# Patient Record
Sex: MALE | Race: WHITE | URBAN - METROPOLITAN AREA
[De-identification: names, ages, dates, MRNs, and addresses within clinical notes are randomized per-mention and may not be internally consistent; named-entity substitution may affect disease eponyms.]

---

## 2024-11-12 DIAGNOSIS — Z00.00 ENCOUNTER FOR PREVENTIVE HEALTH EXAMINATION: Primary | ICD-10-CM

## 2024-11-19 PROBLEM — Z00.00 WELL ADULT EXAM: Status: ACTIVE | Noted: 2024-11-19

## 2024-11-19 PROBLEM — M54.50 CHRONIC LOW BACK PAIN: Status: ACTIVE | Noted: 2024-11-19

## 2024-11-19 PROBLEM — J30.9 ALLERGIC RHINITIS: Status: ACTIVE | Noted: 2024-11-19

## 2024-11-19 PROBLEM — G89.29 CHRONIC LOW BACK PAIN: Status: ACTIVE | Noted: 2024-11-19

## 2024-11-19 NOTE — ASSESSMENT & PLAN NOTE
1st    George is the  of [employer]. He and his family reside in [place].    George's past medical history is significant for chronic low back pain, fibula fracture in 2020, allergic rhinitis, and possible fibromyalgia.    His past surgical history is significant for excision of pilonidal cyst in 2000, and stem cell injection in low back in 2021.    George's past family history is significant for optic drusen in both mother and father.    He is a non-smoker.  Drinks approximately 1 alcoholic beverage per day.  He consumes 1 cup of coffee daily.  George is  with a 4-year-old child.  He states his diet is somewhat healthy.  He exercises regularly; he is an avid surfer, hiker, mountain biker, and ski racer.

## 2024-11-26 ENCOUNTER — LAB (OUTPATIENT)
Dept: LAB | Facility: CLINIC | Age: 44
End: 2024-11-26

## 2024-11-26 ENCOUNTER — OFFICE VISIT (OUTPATIENT)
Dept: FAMILY MEDICINE CLINIC | Facility: CLINIC | Age: 44
End: 2024-11-26

## 2024-11-26 ENCOUNTER — HOSPITAL ENCOUNTER (OUTPATIENT)
Dept: ULTRASOUND IMAGING | Facility: HOSPITAL | Age: 44
Discharge: HOME | End: 2024-11-26

## 2024-11-26 ENCOUNTER — HOSPITAL ENCOUNTER (OUTPATIENT)
Dept: CT IMAGING | Facility: HOSPITAL | Age: 44
Discharge: HOME | End: 2024-11-26

## 2024-11-26 ENCOUNTER — HOSPITAL ENCOUNTER (OUTPATIENT)
Dept: NON INVASIVE DIAGNOSTICS | Facility: CLINIC | Age: 44
Discharge: HOME | End: 2024-11-26

## 2024-11-26 ENCOUNTER — HOSPITAL ENCOUNTER (OUTPATIENT)
Dept: VASCULAR ULTRASOUND | Facility: HOSPITAL | Age: 44
Discharge: HOME | End: 2024-11-26

## 2024-11-26 VITALS
OXYGEN SATURATION: 100 % | DIASTOLIC BLOOD PRESSURE: 68 MMHG | BODY MASS INDEX: 24.22 KG/M2 | HEIGHT: 71 IN | WEIGHT: 173 LBS | RESPIRATION RATE: 14 BRPM | HEART RATE: 73 BPM | SYSTOLIC BLOOD PRESSURE: 108 MMHG

## 2024-11-26 VITALS
WEIGHT: 173 LBS | DIASTOLIC BLOOD PRESSURE: 68 MMHG | BODY MASS INDEX: 24.22 KG/M2 | HEART RATE: 73 BPM | HEIGHT: 71 IN | SYSTOLIC BLOOD PRESSURE: 108 MMHG

## 2024-11-26 DIAGNOSIS — Z00.00 ENCOUNTER FOR PREVENTIVE HEALTH EXAMINATION: ICD-10-CM

## 2024-11-26 DIAGNOSIS — E55.9 VITAMIN D DEFICIENCY: ICD-10-CM

## 2024-11-26 DIAGNOSIS — M54.50 CHRONIC LOW BACK PAIN, UNSPECIFIED BACK PAIN LATERALITY, UNSPECIFIED WHETHER SCIATICA PRESENT: ICD-10-CM

## 2024-11-26 DIAGNOSIS — E03.8 SUBCLINICAL HYPOTHYROIDISM: ICD-10-CM

## 2024-11-26 DIAGNOSIS — R73.01 IMPAIRED FASTING GLUCOSE: Primary | ICD-10-CM

## 2024-11-26 DIAGNOSIS — G89.29 CHRONIC LOW BACK PAIN, UNSPECIFIED BACK PAIN LATERALITY, UNSPECIFIED WHETHER SCIATICA PRESENT: ICD-10-CM

## 2024-11-26 LAB
25(OH)D3 SERPL-MCNC: 23.4 NG/ML (ref 30–100)
ALBUMIN SERPL BCG-MCNC: 4.5 G/DL (ref 3.5–5)
ALP SERPL-CCNC: 50 U/L (ref 34–104)
ALT SERPL W P-5'-P-CCNC: 14 U/L (ref 7–52)
ANION GAP SERPL CALCULATED.3IONS-SCNC: 5 MMOL/L (ref 4–13)
AORTIC ROOT: 3.4 CM
APICAL FOUR CHAMBER EJECTION FRACTION: 55 %
ASCENDING AORTA: 3.1 CM
AST SERPL W P-5'-P-CCNC: 16 U/L (ref 13–39)
ATRIAL RATE: 63 BPM
BACTERIA UR QL AUTO: ABNORMAL /HPF
BASOPHILS # BLD AUTO: 0.02 THOUSANDS/ΜL (ref 0–0.1)
BASOPHILS NFR BLD AUTO: 0 % (ref 0–1)
BILIRUB SERPL-MCNC: 0.63 MG/DL (ref 0.2–1)
BILIRUB UR QL STRIP: NEGATIVE
BSA FOR ECHO PROCEDURE: 1.98 M2
BUN SERPL-MCNC: 17 MG/DL (ref 5–25)
CALCIUM SERPL-MCNC: 9.9 MG/DL (ref 8.4–10.2)
CHEST PAIN STATEMENT: NORMAL
CHLORIDE SERPL-SCNC: 102 MMOL/L (ref 96–108)
CHOLEST SERPL-MCNC: 135 MG/DL (ref ?–200)
CLARITY UR: CLEAR
CO2 SERPL-SCNC: 32 MMOL/L (ref 21–32)
COLOR UR: ABNORMAL
CREAT SERPL-MCNC: 0.8 MG/DL (ref 0.6–1.3)
CRP SERPL HS-MCNC: 2.05 MG/L
E WAVE DECELERATION TIME: 177 MS
E/A RATIO: 1.63
EOSINOPHIL # BLD AUTO: 0.11 THOUSAND/ΜL (ref 0–0.61)
EOSINOPHIL NFR BLD AUTO: 2 % (ref 0–6)
ERYTHROCYTE [DISTWIDTH] IN BLOOD BY AUTOMATED COUNT: 12.4 % (ref 11.6–15.1)
EST. AVERAGE GLUCOSE BLD GHB EST-MCNC: 123 MG/DL
FRACTIONAL SHORTENING: 42 (ref 28–44)
GFR SERPL CREATININE-BSD FRML MDRD: 109 ML/MIN/1.73SQ M
GLUCOSE P FAST SERPL-MCNC: 106 MG/DL (ref 65–99)
GLUCOSE UR STRIP-MCNC: NEGATIVE MG/DL
HBA1C MFR BLD: 5.9 %
HCT VFR BLD AUTO: 42.2 % (ref 36.5–49.3)
HCV AB SER QL: NORMAL
HDLC SERPL-MCNC: 52 MG/DL
HGB BLD-MCNC: 13.9 G/DL (ref 12–17)
HGB UR QL STRIP.AUTO: NEGATIVE
IMM GRANULOCYTES # BLD AUTO: 0.01 THOUSAND/UL (ref 0–0.2)
IMM GRANULOCYTES NFR BLD AUTO: 0 % (ref 0–2)
INTERVENTRICULAR SEPTUM IN DIASTOLE (PARASTERNAL SHORT AXIS VIEW): 0.8 CM
INTERVENTRICULAR SEPTUM: 0.8 CM (ref 0.6–1.1)
KETONES UR STRIP-MCNC: NEGATIVE MG/DL
LAAS-AP2: 20 CM2
LAAS-AP4: 20 CM2
LDLC SERPL CALC-MCNC: 65 MG/DL (ref 0–100)
LEFT ATRIUM SIZE: 3.4 CM
LEFT ATRIUM VOLUME (MOD BIPLANE): 58 ML
LEFT ATRIUM VOLUME INDEX (MOD BIPLANE): 29.3 ML/M2
LEFT INTERNAL DIMENSION IN SYSTOLE: 2.9 CM (ref 2.1–4)
LEFT VENTRICLE DIASTOLIC VOLUME (MOD BIPLANE): 118 ML
LEFT VENTRICLE DIASTOLIC VOLUME INDEX (MOD BIPLANE): 59.6 ML/M2
LEFT VENTRICLE SYSTOLIC VOLUME (MOD BIPLANE): 46 ML
LEFT VENTRICLE SYSTOLIC VOLUME INDEX (MOD BIPLANE): 23.2 ML/M2
LEFT VENTRICULAR INTERNAL DIMENSION IN DIASTOLE: 5 CM (ref 3.5–6)
LEFT VENTRICULAR POSTERIOR WALL IN END DIASTOLE: 0.9 CM
LEFT VENTRICULAR STROKE VOLUME: 88 ML
LEUKOCYTE ESTERASE UR QL STRIP: NEGATIVE
LV EF: 61 %
LVSV (TEICH): 88 ML
LYMPHOCYTES # BLD AUTO: 1.91 THOUSANDS/ΜL (ref 0.6–4.47)
LYMPHOCYTES NFR BLD AUTO: 39 % (ref 14–44)
MAX DIASTOLIC BP: 64 MMHG
MAX HR PERCENT: 102 %
MAX HR: 181 BPM
MAX PREDICTED HEART RATE: 177 BPM
MCH RBC QN AUTO: 28.7 PG (ref 26.8–34.3)
MCHC RBC AUTO-ENTMCNC: 32.9 G/DL (ref 31.4–37.4)
MCV RBC AUTO: 87 FL (ref 82–98)
MONOCYTES # BLD AUTO: 0.44 THOUSAND/ΜL (ref 0.17–1.22)
MONOCYTES NFR BLD AUTO: 9 % (ref 4–12)
MUCOUS THREADS UR QL AUTO: ABNORMAL
MV E'TISSUE VEL-LAT: 17 CM/S
MV E'TISSUE VEL-SEP: 13 CM/S
MV PEAK A VEL: 0.38 M/S
MV PEAK E VEL: 62 CM/S
MV STENOSIS PRESSURE HALF TIME: 51 MS
MV VALVE AREA P 1/2 METHOD: 4.31
NEUTROPHILS # BLD AUTO: 2.39 THOUSANDS/ΜL (ref 1.85–7.62)
NEUTS SEG NFR BLD AUTO: 50 % (ref 43–75)
NITRITE UR QL STRIP: NEGATIVE
NON-SQ EPI CELLS URNS QL MICRO: ABNORMAL /HPF
NRBC BLD AUTO-RTO: 0 /100 WBCS
P AXIS: 56 DEGREES
PH UR STRIP.AUTO: 5 [PH]
PLATELET # BLD AUTO: 177 THOUSANDS/UL (ref 149–390)
PMV BLD AUTO: 9.1 FL (ref 8.9–12.7)
POTASSIUM SERPL-SCNC: 4.1 MMOL/L (ref 3.5–5.3)
PR INTERVAL: 150 MS
PROT SERPL-MCNC: 7.2 G/DL (ref 6.4–8.4)
PROT UR STRIP-MCNC: NEGATIVE MG/DL
PROTOCOL NAME: NORMAL
PSA SERPL-MCNC: 0.22 NG/ML (ref 0–4)
QRS AXIS: 54 DEGREES
QRSD INTERVAL: 78 MS
QT INTERVAL: 390 MS
QTC INTERVAL: 400 MS
RA PRESSURE ESTIMATED: 10 MMHG
RATE PRESSURE PRODUCT: NORMAL
RBC # BLD AUTO: 4.84 MILLION/UL (ref 3.88–5.62)
RBC #/AREA URNS AUTO: ABNORMAL /HPF
REASON FOR TERMINATION: NORMAL
RIGHT ATRIUM AREA SYSTOLE A4C: 13.8 CM2
RIGHT VENTRICLE ID DIMENSION: 3.5 CM
RV PSP: 35 MMHG
SL CV LEFT ATRIUM LENGTH A2C: 5.2 CM
SL CV LV EF: 55
SL CV PED ECHO LEFT VENTRICLE DIASTOLIC VOLUME (MOD BIPLANE) 2D: 121 ML
SL CV PED ECHO LEFT VENTRICLE SYSTOLIC VOLUME (MOD BIPLANE) 2D: 33 ML
SL CV STRESS RECOVERY BP: NORMAL MMHG
SL CV STRESS RECOVERY HR: 108 BPM
SL CV STRESS RECOVERY O2 SAT: 98 %
SODIUM SERPL-SCNC: 139 MMOL/L (ref 135–147)
SP GR UR STRIP.AUTO: 1.02 (ref 1–1.03)
STRESS ANGINA INDEX: 0
STRESS BASELINE BP: NORMAL MMHG
STRESS BASELINE HR: 70 BPM
STRESS O2 SAT REST: 99 %
STRESS PEAK HR: 181 BPM
STRESS POST ESTIMATED WORKLOAD: 17.2 METS
STRESS POST EXERCISE DUR MIN: 15 MIN
STRESS POST EXERCISE DUR MIN: 15 MIN
STRESS POST EXERCISE DUR SEC: 0 SEC
STRESS POST O2 SAT PEAK: 98 %
STRESS POST PEAK BP: 130 MMHG
STRESS POST PEAK HR: 181 BPM
STRESS POST PEAK SYSTOLIC BP: 130 MMHG
T WAVE AXIS: 50 DEGREES
T4 FREE SERPL-MCNC: 0.89 NG/DL (ref 0.61–1.12)
TARGET HR FORMULA: NORMAL
TR MAX PG: 25 MMHG
TR PEAK VELOCITY: 2.5 M/S
TRICUSPID ANNULAR PLANE SYSTOLIC EXCURSION: 2.5 CM
TRICUSPID VALVE PEAK REGURGITATION VELOCITY: 2.51 M/S
TRIGL SERPL-MCNC: 92 MG/DL (ref ?–150)
TSH SERPL DL<=0.05 MIU/L-ACNC: 4.51 UIU/ML (ref 0.45–4.5)
UROBILINOGEN UR STRIP-ACNC: <2 MG/DL
VENTRICULAR RATE: 63 BPM
WBC # BLD AUTO: 4.88 THOUSAND/UL (ref 4.31–10.16)
WBC #/AREA URNS AUTO: ABNORMAL /HPF

## 2024-11-26 PROCEDURE — 86141 C-REACTIVE PROTEIN HS: CPT

## 2024-11-26 PROCEDURE — VASC: Performed by: SURGERY

## 2024-11-26 PROCEDURE — 84439 ASSAY OF FREE THYROXINE: CPT

## 2024-11-26 PROCEDURE — 85025 COMPLETE CBC W/AUTO DIFF WBC: CPT

## 2024-11-26 PROCEDURE — 99499EX: Performed by: FAMILY MEDICINE

## 2024-11-26 PROCEDURE — 93350 STRESS TTE ONLY: CPT | Performed by: INTERNAL MEDICINE

## 2024-11-26 PROCEDURE — 80061 LIPID PANEL: CPT

## 2024-11-26 PROCEDURE — 84153 ASSAY OF PSA TOTAL: CPT

## 2024-11-26 PROCEDURE — 84443 ASSAY THYROID STIM HORMONE: CPT

## 2024-11-26 PROCEDURE — 93306 TTE W/DOPPLER COMPLETE: CPT

## 2024-11-26 PROCEDURE — 83695 ASSAY OF LIPOPROTEIN(A): CPT

## 2024-11-26 PROCEDURE — 82306 VITAMIN D 25 HYDROXY: CPT

## 2024-11-26 PROCEDURE — 93306 TTE W/DOPPLER COMPLETE: CPT | Performed by: INTERNAL MEDICINE

## 2024-11-26 PROCEDURE — 86803 HEPATITIS C AB TEST: CPT

## 2024-11-26 PROCEDURE — 93922 UPR/L XTREMITY ART 2 LEVELS: CPT

## 2024-11-26 PROCEDURE — 93350 STRESS TTE ONLY: CPT

## 2024-11-26 PROCEDURE — 81001 URINALYSIS AUTO W/SCOPE: CPT

## 2024-11-26 PROCEDURE — 93010 ELECTROCARDIOGRAM REPORT: CPT | Performed by: INTERNAL MEDICINE

## 2024-11-26 PROCEDURE — 93005 ELECTROCARDIOGRAM TRACING: CPT

## 2024-11-26 PROCEDURE — 83036 HEMOGLOBIN GLYCOSYLATED A1C: CPT

## 2024-11-26 PROCEDURE — 75571 CT HRT W/O DYE W/CA TEST: CPT

## 2024-11-26 PROCEDURE — 80053 COMPREHEN METABOLIC PANEL: CPT

## 2024-11-26 PROCEDURE — 36415 COLL VENOUS BLD VENIPUNCTURE: CPT

## 2024-11-26 PROCEDURE — 76700 US EXAM ABDOM COMPLETE: CPT

## 2024-11-26 NOTE — PROGRESS NOTES
Fitness Summary and Recommendations: George scored at the 70% on his body composition assessment with a bodyfat % of 20.2%. George scored in the fair range for flexibility with a sit & reach score of 18 cm. His Muscle Strength/Endurance scores placed him at the 90% (lower body) and 95% (upper body) with a chair stand score of 38 and arm curl score of 34 respectively. George's Cardiovascular Score of 60.3 placed him at the 95%. Overall, George would be considered to have an above average fitness level with a 77% score. Continued emphasis on regular exercise and sound nutrition practices will enable George to reach his optimal level of fitness.

## 2024-11-26 NOTE — PROGRESS NOTES
Hearing Assessment Summary:   Hearing Screening    250Hz 500Hz 1000Hz 2000Hz 3000Hz 4000Hz 6000Hz 8000Hz   Right ear 15 15 20 30 60 60 45 35   Left ear 15 15 20 35 45 55 40 20   Comments: HEARING EVALUATION    Name:  John Rodriguez  :  1980  Age:  44 y.o.   MRN:  [id number]  Date of Evaluation: 24     History: [facility] Exam  Reason for visit: John Rodriguez is being seen today for an evaluation of hearing as part of an [facility] examination.  Patient reports that his wife notices that he has difficulty understanding. He denies ear pain, dizziness and tinnitus. Patient notes frequent ear infections (outer ear?). He notes previous history of noise exposure at concerts and recreational shooting (used ear protection).       EVALUATION:    Otoscopic Evaluation:   Right Ear: Clear and healthy ear canal and tympanic membrane   Left Ear: Clear and healthy ear canal and tympanic membrane    Tympanometry:   Right: Type A - normal middle ear pressure and compliance   Left: Type A - normal middle ear pressure and compliance    Audiogram Results:  Normal through 1k Hz sloping to moderate sensorineural hearing loss centered around 3-4k Hz in each ear. 92% and 96% speech discrimination ability in the right and left ears, respectively.    *see attached audiogram      RECOMMENDATIONS:  Annual hearing eval, Return to [facility]. for F/U, Hearing Aid Evaluation, Hearing Protection, and Copy to Patient/Caregiver    PATIENT EDUCATION:   Discussed results and recommendations with patient.  Questions were addressed and the patient was encouraged to contact our department should concerns arise.      Ian Dill.  Clinical Audiologist'      Vision Screening    Right eye Left eye Both eyes   Without correction      With correction 20/15 20/15 20/13

## 2024-11-26 NOTE — PROGRESS NOTES
"  Your [facility] Board-Certified Dermatologist's Name: Caleb Zelaya MD    Skin Screening Exam:    A chaperone was present throughout the entire encounter.      SKIN:  FULL ORGAN SYSTEM EXAM   Hair, Scalp, Ears, Face Normal except as noted below in Assessment   Neck, Cervical Chain Nodes Normal except as noted below in Assessment   Right Arm/Hand/Fingers Normal except as noted below in Assessment   Left Arm/Hand/Fingers Normal except as noted below in Assessment   Chest/Breasts/Axillae Did the patient specifically refuse to have the areas \"under-the-bra\" examined by the Dermatologist? No  Examined areas normal except as noted below in Assessment   Abdomen, Umbilicus Normal except as noted below in Assessment   Back/Spine Normal except as noted below in Assessment   Groin/Genitalia/Buttocks Did the patient specifically refuse to have the areas \"under-the-underwear\" examined by the Dermatologist? YES  Examined areas normal except as noted below in Assessment   Right Leg, Foot, Toes Normal except as noted below in Assessment   Left Leg, Foot, Toes Normal except as noted below in Assessment        Assessment and Plan by Diagnosis:    Use a moisturizer + sunscreen \"combo\" product such as Neutrogena Daily Defense SPF 50+ or CeraVe AM at least three times a day.  Follow-up with your private dermatologist or one of our board-certified [facility] D[facility]atologists as discussed.  [facility]'s own Dr. Jazmín Taylor is available for consultation for skin enhancement and revitalization services; please mention \"[facility]\" for expedited scheduling  Nevi and Moles and Angiomas - All appear clinically benign  Use a moisturizer + sunscreen \"combo\" product such as Neutrogena Daily Defense SPF 50+ or CeraVe AM at least three times a day.  Annual exams    "

## 2024-11-26 NOTE — ASSESSMENT & PLAN NOTE
"You blood sugar was mildly elevated today at 106 (normal is less than 100). Also, your long term blood sugars (A1C) were also elevated at 6.0% (normal less than 5.7%). This result is similar to labs you had done 2 years ago. I would recommend watching/reducing \"bad carbohydrates\" in your diet. These include refined sugars, bread, potatoes, and pasta. I would also recommend restarting a regular aerobic exercise program (at least 150 minutes of aerobic exercise weekly).   "

## 2024-11-26 NOTE — PROGRESS NOTES
[facility] Physical Exam     John Rodriguez is a 44 y.o. male who is presenting for his 1st [facility] Physical Exam at [facility]. George is the  of [employer]. He and his family reside in [facility].     George's past medical history is significant for chronic low back pain, fibula fracture in 2020, and allergic rhinitis.     His past surgical history is significant for excision of pilonidal cyst in 2000, and stem cell injection in low back in 2021.    George's past family history is significant for optic drusen in both mother and father.    He is a non-smoker.  He drinks approximately 3-4 alcoholic beverages per week.   He consumes 1 small cup of coffee daily.  George is  with a 4-year-old child.  He states his diet is somewhat healthy,  but working in the [employer] business, he has plenty of room for improvement.  He no longer exercises regularly, but he has been an avid surfer, hiker, mountain biker, and ski racer throughout his life.     Review of Systems   Constitutional:  Negative for chills and fever.   HENT:  Negative for ear pain and sore throat.    Eyes:  Negative for pain and visual disturbance.   Respiratory:  Negative for cough and shortness of breath.    Cardiovascular:  Negative for chest pain and palpitations.   Gastrointestinal:  Negative for abdominal pain and vomiting.   Genitourinary:  Negative for dysuria and hematuria.   Musculoskeletal:  Negative for arthralgias and back pain.   Skin:  Negative for color change and rash.   Neurological:  Negative for seizures and syncope.   All other systems reviewed and are negative.        Active Ambulatory Problems     Diagnosis Date Noted    Chronic low back pain 11/19/2024    Allergic rhinitis 11/19/2024    Vitamin D deficiency 11/26/2024    Subclinical hypothyroidism 11/26/2024    Impaired fasting glucose 11/26/2024     Resolved Ambulatory Problems     Diagnosis Date Noted    Well adult  "exam 11/19/2024     No Additional Past Medical History       Past Surgical History:   Procedure Laterality Date    PILONIDAL CYST EXCISION  2000       Family History   Problem Relation Age of Onset    Other (drusen) Mother     Other (drusen) Father        Social History     Tobacco Use   Smoking Status Never   Smokeless Tobacco Never       No current outpatient medications on file.    No Known Allergies      Objective:    Vitals:    11/26/24 0857   BP: 108/68   Pulse: 73   Resp: 14   SpO2: 100%   Weight: 78.5 kg (173 lb)   Height: 5' 11\" (1.803 m)        Physical Exam  Constitutional:       Appearance: Normal appearance.   HENT:      Head: Normocephalic and atraumatic.      Right Ear: Tympanic membrane, ear canal and external ear normal. There is no impacted cerumen.      Left Ear: Tympanic membrane, ear canal and external ear normal. There is no impacted cerumen.      Nose: Nose normal.      Mouth/Throat:      Mouth: Mucous membranes are moist.      Pharynx: Oropharynx is clear. No posterior oropharyngeal erythema.   Eyes:      Extraocular Movements: Extraocular movements intact.      Conjunctiva/sclera: Conjunctivae normal.      Pupils: Pupils are equal, round, and reactive to light.   Neck:      Vascular: No carotid bruit.   Cardiovascular:      Rate and Rhythm: Normal rate and regular rhythm.      Pulses: Normal pulses.      Heart sounds: Normal heart sounds. No murmur heard.  Pulmonary:      Effort: Pulmonary effort is normal.      Breath sounds: Normal breath sounds.   Abdominal:      General: Abdomen is flat. Bowel sounds are normal. There is no distension.      Palpations: Abdomen is soft. There is no mass.      Tenderness: There is no abdominal tenderness.      Hernia: No hernia is present.   Musculoskeletal:         General: Normal range of motion.      Cervical back: Normal range of motion and neck supple.   Lymphadenopathy:      Cervical: No cervical adenopathy.   Skin:     General: Skin is warm.      " "Capillary Refill: Capillary refill takes less than 2 seconds.      Coloration: Skin is not jaundiced.      Findings: No rash.   Neurological:      General: No focal deficit present.      Mental Status: He is alert and oriented to person, place, and time.      Cranial Nerves: No cranial nerve deficit.      Motor: No weakness.      Gait: Gait normal.   Psychiatric:         Mood and Affect: Mood normal.         Behavior: Behavior normal.         Thought Content: Thought content normal.         Judgment: Judgment normal.             Assessment/Plan:     1. Impaired fasting glucose  Assessment & Plan:  You blood sugar was mildly elevated today at 106 (normal is less than 100). Also, your long term blood sugars (A1C) were also elevated at 6.0% (normal less than 5.7%). This result is similar to labs you had done 2 years ago. I would recommend watching/reducing \"bad carbohydrates\" in your diet. These include refined sugars, bread, potatoes, and pasta. I would also recommend restarting a regular aerobic exercise program (at least 150 minutes of aerobic exercise weekly).   2. Subclinical hypothyroidism  Assessment & Plan:  Your thyroid was borderline low normal today. This looks like it is a new finding for you George. Fortunately, this does not require any treatment at this time. I would recommend repeating thyroid labs again in 6 months.   3. Vitamin D deficiency  Assessment & Plan:  Your vitamin D level is low today at 23.4 (normal is greater than 30). Vitamin D is required in order for your bones to absorb calcium. I would recommend starting an OTC vitamin D3 supplement (2000 IU daily).   4. Chronic low back pain, unspecified back pain laterality, unspecified whether sciatica present  Assessment & Plan:  You have a longstanding history of chronic low back pain, including stem cell injection in 2021.  You mentioned that your back gives you less trouble when you are more active. I would recommend restarting a regular exercise " regimen including low back stretching exercises, and at least 150 minutes of  aerobic exercise weekly.        [facility] Summary:     Overall, I think you are in pretty good health today.  Your skin exam today did not reveal any significant abnormalities.  I would recommend continued annual checkups with a dermatologist.  Your cardiology testing was negative (including EKG, stress test, and echocardiogram).     (Also please refer to individually listed diagnoses, cardiology, dermatology audiology, fitness, and nutrition reports for more information).    You mentioned that you had a colonoscopy a number of years ago.  I would recommend repeating this at age 46    I reviewed your vaccination history.  You have received a flu shot this season. It looks like you are due for a tetanus booster (this should be updated every 10 years). You can get this at your PCP or local pharmacy.     Lastly, I would recommend continuing a healthy diet (including vitamin D 2000 international units daily and calcium 1200 mg daily).  I would also recommend a regular exercise program (at least 150 minutes of aerobic exercise weekly).       George,    Thank you for choosing [facility].  It was a pleasure meeting and getting to know you today.  If you have any questions regarding today's exam, feel free to contact me. We hope to see you again in the future.     Derik Larson ([facility] Lead Physician)  [phone]  [email or web address]

## 2024-11-26 NOTE — ASSESSMENT & PLAN NOTE
Your vitamin D level is low today at 23.4 (normal is greater than 30). Vitamin D is required in order for your bones to absorb calcium. I would recommend starting an OTC vitamin D3 supplement (2000 IU daily).

## 2024-11-26 NOTE — ASSESSMENT & PLAN NOTE
You have a longstanding history of chronic low back pain, including stem cell injection in 2021.  You mentioned that your back gives you less trouble when you are more active. I would recommend restarting a regular exercise regimen including low back stretching exercises, and at least 150 minutes of  aerobic exercise weekly.

## 2024-11-26 NOTE — PROGRESS NOTES
"Nutritional Summary and Recommendations:    Patient Nutrition-Oriented Medical/Diet Hx  John presents today to [facility] for nutrition education and assessment.Alec has a good appetite and eats a regular diet, NKFA or intolerances.  He eats a variety of foods and given he works in the [employer] field he is often eating at restaurants and tries to make healthy choices. Feels living in [place] he has more access to fresh fruits and vegetables and that he is eating them more than he was before.  Discussed tips for balancing food groups throughout the day and benefits of avoiding skipping meals when possible.  Discussed recommended fluid intake and tips for increasing, benefits of increasing intake.  Discussed tips for vitamin D foods given low vitamin D today. A1c slightly elevated, reviewed how fiber & protein can help with BG levels. John was very receptive and a pleasure to meet with today.      Nutrition Related Medications/Supplements:     Magnesium, Omega , Maitaki mushroom, Pro-biotic, Tumeric, Vitamin A & D, Occasional zinc or oregano oil for control of stomach acid  Labs:  A1C 5.9  Total Cholesterol 135  Triglycerides 92  HDL 52  LDL 65  Vitamin D 23.4      Anthropometrics:     Ht: 5'10.5\" Wt: 170.8 lbs    BMI: 24.2      BMR: 1740 kcal  Fat%: 20.2%  Acceptable Range: 11-22 %     Fat Mass: 34.6 lb  FFM: 136.2 lb   TBW:99.6 lb      Estimated Energy Requirements:    Actual wt  8960-5928 kcal (BMR + AF 1.25-1.35)  78 g protein (1.0 g/kg)  3034-8176 mL = 77-91 oz (25-35 mL/kg)      Nutrition Recommendations:    Aim for 77-91 oz of fluid per day  Incorporate vegetables twice per day most days to help increase fiber intake  Reach out to RD with any questions or concerns    Nutrition Education    Balanced eating, increasing fiber and fluid.      Perceived Comprehension: Good    Expected Compliance: Good  "

## 2024-11-26 NOTE — ASSESSMENT & PLAN NOTE
Your thyroid was borderline low normal today. This looks like it is a new finding for you George. Fortunately, this does not require any treatment at this time. I would recommend repeating thyroid labs again in 6 months.

## 2024-11-26 NOTE — PROGRESS NOTES
HEART AND VASCULAR SUMMARY      1. Vital Signs:  Blood Pressure 108/68 mmHg, Pulse 73 bpm    2. Lipids: Total 135, TG 92, HDL 52, LDL 65, hs C-RP 2.05    3. ECG: Normal sinus rhythm    4. Echocardiogram: Normal cardiac function with no significant valvular abnormalities.     5. Stress ECHO: Excellent exercise capacity with no myocardial ischemia.     6. Vascular testing: Carotids - normal, Abdominal Aorta - normal    7. Coronary Calcium CT: 0    8. Cardiovascular risk score: 0.6% 10 year risk for heart disease or stroke.      Impression and Recommendations:    Mr. Rodriguez is a healthy 44 year old man with no cardiac history who presents for an [facility].  He is asymptomatic from a cardiac standpoint.  He denies chest pain, shortness of breath, or palpitations.  Until recently, he exercised vigorously on a regular basis.     Excellent cardiovascular fitness.  Continue with regular exercise regimen and healthy diet.

## 2024-11-27 LAB — LPA SERPL-SCNC: 100.5 NMOL/L

## 2024-11-28 ENCOUNTER — RESULTS FOLLOW-UP (OUTPATIENT)
Dept: FAMILY MEDICINE CLINIC | Facility: CLINIC | Age: 44
End: 2024-11-28